# Patient Record
Sex: MALE | Race: BLACK OR AFRICAN AMERICAN | Employment: OTHER | URBAN - METROPOLITAN AREA
[De-identification: names, ages, dates, MRNs, and addresses within clinical notes are randomized per-mention and may not be internally consistent; named-entity substitution may affect disease eponyms.]

---
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
## 2022-02-25 ENCOUNTER — OFFICE VISIT (OUTPATIENT)
Dept: NEUROLOGY | Age: 22
End: 2022-02-25
Payer: MEDICAID

## 2022-02-25 VITALS
HEART RATE: 80 BPM | SYSTOLIC BLOOD PRESSURE: 124 MMHG | DIASTOLIC BLOOD PRESSURE: 88 MMHG | OXYGEN SATURATION: 97 % | BODY MASS INDEX: 27.16 KG/M2 | TEMPERATURE: 97.4 F | HEIGHT: 71 IN | WEIGHT: 194 LBS

## 2022-02-25 DIAGNOSIS — G40.919 BREAKTHROUGH SEIZURE (HCC): ICD-10-CM

## 2022-02-25 DIAGNOSIS — G40.909 SEIZURE DISORDER (HCC): Primary | ICD-10-CM

## 2022-02-25 PROCEDURE — 99205 OFFICE O/P NEW HI 60 MIN: CPT | Performed by: NURSE PRACTITIONER

## 2022-02-25 RX ORDER — LEVETIRACETAM 1000 MG/1
1000 TABLET ORAL 2 TIMES DAILY
Qty: 60 TABLET | Refills: 5 | Status: SHIPPED | OUTPATIENT
Start: 2022-02-25 | End: 2022-09-27 | Stop reason: ALTCHOICE

## 2022-02-25 RX ORDER — LEVETIRACETAM 1000 MG/1
1000 TABLET ORAL 2 TIMES DAILY
COMMUNITY
End: 2022-02-25 | Stop reason: ALTCHOICE

## 2022-02-25 NOTE — PROGRESS NOTES
Sara Christiansen is a [address] y.o. male who presents with the following  Chief Complaint   Patient presents with    Epilepsy       HPI    New patient with aunt for seizures. 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 Keppra level was 0   He had been out for a few days per report  1000 mg BID works well for him and he does well when he takes it appropriately. He has not had any since then   He does not get headaches  He has some cognitive deficits post accident   He has noticed he can not remember much  Mentally has trouble expressing, comprehending. Aunt agrees. He does feel like overall he is doing well today   Likes to watch TV   Play computer games. Sleeping well   Eating well. Some dizziness when getting up from bed in the morning. Allergies   Allergen Reactions    Bactrim [Sulfamethoprim Ds] Itching    Zithromax [Azithromycin] Hives       Current Outpatient Medications   Medication Sig    levETIRAcetam (Keppra) 1,000 mg tablet Take 1 Tablet by mouth two (2) times a day. No current facility-administered medications for this visit. Social History     Tobacco Use   Smoking Status Passive Smoke Exposure - Never Smoker   Smokeless Tobacco Never Used       Past Medical History:   Diagnosis Date    Other ill-defined conditions(611.89)     ADHD       No past surgical history on file. No family history on file.     Social History     Socioeconomic History    Marital status: SINGLE   Tobacco Use    Smoking status: Passive Smoke Exposure - Never Smoker    Smokeless tobacco: Never Used   Substance and Sexual Activity  Alcohol use: No    Drug use: No       Review of Systems   Eyes: Negative for blurred vision, double vision and photophobia. Gastrointestinal: Negative for nausea and vomiting. Neurological: Positive for dizziness and seizures. Negative for loss of consciousness, weakness and headaches. Remainder of comprehensive review is negative. Physical Exam :    Visit Vitals  /88   Pulse 80   Temp 97.4 °F (36.3 °C)   Ht 5' 11\" (1.803 m)   Wt 88 kg (194 lb)   SpO2 97%   BMI 27.06 kg/m²       General: Well defined, nourished, and groomed individual in no acute distress.    Neck: Supple, nontender, no bruits, no pain with resistance to active range of motion.    Musculoskeletal: Extremities revealed no edema and had full range of motion of joints.    Psych: Good mood and bright affect    NEUROLOGICAL EXAMINATION:    Mental Status: Alert and oriented to person, place, and time    Cranial Nerves:    II, III, IV, VI: Visual acuity grossly intact. Visual fields are normal.    Pupils are equal, round, and reactive to light and accommodation.    Extra-ocular movements are full and fluid. Fundoscopic exam was benign, no ptosis or nystagmus.    V-XII: Hearing is grossly intact. Facial features are symmetric, with normal sensation and strength. The palate rises symmetrically and the tongue protrudes midline. Sternocleidomastoids 5/5. Motor Examination: Normal tone, bulk, and strength, 5/5 muscle strength throughout. Coordination: Finger to nose was normal. No resting or intention tremor    Gait and Station: Steady while walking. Normal arm swing. No pronator drift. No muscle wasting or fasiculations noted. Reflexes: DTRs 2+ throughout.       Results for orders placed or performed during the hospital encounter of 03/18/14   URINALYSIS W/ REFLEX CULTURE    Specimen: Urine   Result Value Ref Range    Color YELLOW/STRAW     Appearance CLEAR CLEAR    Specific gravity 1.030 1.003 - 1.030      pH (UA) 6.0 5.0 - 8.0      Protein NEGATIVE  NEGATIVE mg/dL    Glucose NEGATIVE  NEGATIVE mg/dL    Ketone NEGATIVE  NEGATIVE mg/dL    Bilirubin NEGATIVE  NEGATIVE    Blood NEGATIVE  NEGATIVE    Urobilinogen 0.2 0.2 - 1.0 EU/dL    Nitrites NEGATIVE  NEGATIVE    Leukocyte Esterase NEGATIVE  NEGATIVE    UA:UC IF INDICATED CULTURE NOT INDICATED BY UA RESULT CULTURE NOT INDICATE    WBC 0-4 0 - 4 /hpf    RBC 0-5 0 - 5 /hpf    Epithelial cells FEW FEW /lpf    Bacteria NEGATIVE  NEGATIVE /hpf    Hyaline cast 0-2 0 - 2   CBC WITH AUTOMATED DIFF   Result Value Ref Range    WBC 17.4 (H) 3.8 - 9.8 K/uL    RBC 4.84 4.03 - 5.29 M/uL    HGB 13.8 11.0 - 14.5 g/dL    HCT 38.7 33.9 - 43.5 %    MCV 80.0 76.7 - 89.2 FL    MCH 28.5 25.2 - 30.2 PG    MCHC 35.7 (H) 31.8 - 34.8 g/dL    RDW 13.1 12.4 - 14.5 %    PLATELET 275 305 - 725 K/uL    NEUTROPHILS 83 (H) 33 - 75 %    LYMPHOCYTES 11 (L) 16 - 53 %    MONOCYTES 6 4 - 12 %    EOSINOPHILS 0 0 - 4 %    BASOPHILS 0 0 - 1 %    ABS. NEUTROPHILS 14.4 (H) 1.5 - 7.0 K/UL    ABS. LYMPHOCYTES 1.9 1.0 - 3.3 K/UL    ABS. MONOCYTES 1.1 (H) 0.2 - 0.8 K/UL    ABS. EOSINOPHILS 0.0 0.0 - 0.4 K/UL    ABS. BASOPHILS 0.0 0.0 - 0.1 K/UL       Orders Placed This Encounter    MRI BRAIN W WO CONT     Standing Status:   Future     Standing Expiration Date:   3/25/2023     Order Specific Question:   STAT Creatinine as indicated     Answer: Yes    EEG AMB NEURO     Order Specific Question:   Reason for Exam:     Answer:   seizure    DISCONTD: levETIRAcetam 1,000 mg tablet     Sig: Take 1,000 mg by mouth two (2) times a day.  levETIRAcetam (Keppra) 1,000 mg tablet     Sig: Take 1 Tablet by mouth two (2) times a day. Dispense:  60 Tablet     Refill:  5       1. Seizure disorder ([address]    2. Breakthrough seizure ([facility]      MRI brain to rule out stroke, lesion, mass. EEG for evaluation of epilepsy  Will get records from [facility] and [facility] brain surgery and can evaluate for better treatment.    Keep Keppra at 1000 mg BID   Will send plenty of refills. Keep track of symptoms. Understands what drops threshold.                This note will not be viewable in [employer]

## 2022-03-04 DIAGNOSIS — G40.919 BREAKTHROUGH SEIZURE (HCC): Primary | ICD-10-CM

## 2022-03-04 DIAGNOSIS — G40.909 SEIZURE DISORDER (HCC): ICD-10-CM

## 2022-04-06 ENCOUNTER — HOSPITAL ENCOUNTER (OUTPATIENT)
Dept: MRI IMAGING | Age: 22
Discharge: HOME | End: 2022-04-06
Attending: NURSE PRACTITIONER

## 2022-04-06 VITALS — WEIGHT: 194 LBS | BODY MASS INDEX: 27.06 KG/M2

## 2022-04-06 DIAGNOSIS — G40.909 SEIZURE DISORDER (HCC): ICD-10-CM

## 2022-04-06 DIAGNOSIS — G40.919 BREAKTHROUGH SEIZURE (HCC): ICD-10-CM

## 2022-04-06 RX ORDER — MIDAZOLAM HYDROCHLORIDE 1 MG/ML
5 INJECTION, SOLUTION INTRAMUSCULAR; INTRAVENOUS
Status: DISCONTINUED | OUTPATIENT
Start: 2022-04-06 | End: 2022-04-10 | Stop reason: HOSPADM

## 2022-04-06 RX ORDER — FENTANYL CITRATE 50 UG/ML
100 INJECTION, SOLUTION INTRAMUSCULAR; INTRAVENOUS
Status: DISCONTINUED | OUTPATIENT
Start: 2022-04-06 | End: 2022-04-10 | Stop reason: HOSPADM

## 2022-04-06 RX ORDER — GADOTERATE MEGLUMINE 376.9 MG/ML
17 INJECTION INTRAVENOUS
Status: ACTIVE | OUTPATIENT
Start: 2022-04-06 | End: 2022-04-06

## 2022-04-06 NOTE — PROGRESS NOTES
[id number] Patient brought ambulatory back to [facility] for scheduled MRI of brain with conscious sedation. Placed in a gown NPO and  confirmed. MRI screening form reviewed and patient with previous brain surgery with hardware in place. Patient or patient's mother do not have medical device card for hardware so MRI to be rescheduled after mother obtains from doctor. Patient dressed and escorted back to waiting area. Mother with number for scheduling.

## 2022-05-12 ENCOUNTER — TELEPHONE (OUTPATIENT)
Dept: NEUROLOGY | Age: 22
End: 2022-05-12

## 2022-05-12 ENCOUNTER — PATIENT MESSAGE (OUTPATIENT)
Dept: NEUROLOGY | Age: 22
End: 2022-05-12

## 2022-05-12 NOTE — TELEPHONE ENCOUNTER
Yes please  And can you get a record release  And see if they changed his medications?    Likely will want to increase if no trigger

## 2022-05-12 NOTE — TELEPHONE ENCOUNTER
Per [facility] My Chart message sent 5/11/22 @ 5:49 PM,  Patient had a seizure 5/10/22 around 10 am. He was taken to [facility] ER. CT did not show anything. MRI is not done yet due to bolt in head. Ms Shilpa Charles wants to know if your want to see him now or after the MRI, which should be soon. Patient has no future appointments.

## 2022-06-06 ENCOUNTER — OFFICE VISIT (OUTPATIENT)
Dept: NEUROLOGY | Age: 22
End: 2022-06-06
Payer: MEDICAID

## 2022-06-06 VITALS
TEMPERATURE: 97.9 F | OXYGEN SATURATION: 97 % | HEART RATE: 67 BPM | SYSTOLIC BLOOD PRESSURE: 102 MMHG | DIASTOLIC BLOOD PRESSURE: 66 MMHG

## 2022-06-06 DIAGNOSIS — G40.909 SEIZURE DISORDER (HCC): Primary | ICD-10-CM

## 2022-06-06 DIAGNOSIS — G40.919 BREAKTHROUGH SEIZURE (HCC): ICD-10-CM

## 2022-06-06 PROCEDURE — 99215 OFFICE O/P EST HI 40 MIN: CPT | Performed by: NURSE PRACTITIONER

## 2022-06-06 RX ORDER — MIDAZOLAM 5 MG/.1ML
1 SPRAY NASAL
Qty: 2 EACH | Refills: 1 | Status: SHIPPED | OUTPATIENT
Start: 2022-06-06

## 2022-06-06 RX ORDER — LEVETIRACETAM 1000 MG/1
TABLET ORAL
Qty: 90 TABLET | Refills: 5 | Status: SHIPPED | OUTPATIENT
Start: 2022-06-06

## 2022-06-07 NOTE — PROGRESS NOTES
Lamar Head is a 22 y.o. male who presents with the following  Chief Complaint   Patient presents with    Epilepsy       HPI    Fu with family for epilepsy. Did have a few breakthrough seizures since last visit  Has been on Keppra 1000 mg BID   Taking as he should   Went to [facility] ER earlier this month for a breakthrough   He felt it coming on   He felt off, dizzy. Had a seizure lasting about 2 minutes  Called EMS and was finished by the time they got there  He did bite his tongue. He has not used Nayzilam   He is not having any headaches, dizziness. No weakness, paresthesia  Playing games during the day   He is sleeping well per report  No anxiety or depression   He feels good today   Did not get testing due to possibly clip in head  Will re-order and needs sedation it will be fine   Will re-order EEG   May need a partial type drug. Allergies   Allergen Reactions    Bactrim [Sulfamethoprim Ds] Itching    Zithromax [Azithromycin] Hives       Current Outpatient Medications   Medication Sig    midazolam (Nayzilam) 5 mg/spray (0.1 mL) spry [place] by Both Nostrils route daily as needed (SEIZURE).  levETIRAcetam (Keppra) 1,000 mg tablet Take 1.5 tablets by mouth BID    levETIRAcetam (Keppra) 1,000 mg tablet Take 1 Tablet by mouth two (2) times a day. No current facility-administered medications for this visit. Social History     Tobacco Use   Smoking Status Passive Smoke Exposure - Never Smoker   Smokeless Tobacco Never Used       Past Medical History:   Diagnosis Date    Other ill-defined conditions(240.74)     ADHD       No past surgical history on file. No family history on file.     Social History     Socioeconomic History    Marital status: SINGLE   Tobacco Use    Smoking status: Passive Smoke Exposure - Never Smoker    Smokeless tobacco: Never Used   Substance and Sexual Activity    Alcohol use: No    Drug use: No       Review of Systems   Eyes: Negative for blurred vision, double vision and photophobia. Respiratory: Negative for shortness of breath and wheezing. Cardiovascular: Negative for chest pain and palpitations. Neurological: Positive for seizures and loss of consciousness. Negative for dizziness, tingling and headaches. Remainder of comprehensive review is negative. Physical Exam :    Visit Vitals  /66   Pulse 67   Temp 97.9 °F (36.6 °C)   SpO2 97%       General: Well defined, nourished, and groomed individual in no acute distress.    Neck: Supple, nontender, no bruits, no pain with resistance to active range of motion.    Musculoskeletal: Extremities revealed no edema and had full range of motion of joints.    Psych: Good mood and bright affect    NEUROLOGICAL EXAMINATION:    Mental Status: Alert and oriented to person, place, and time    Cranial Nerves:    II, III, IV, VI: Visual acuity grossly intact. Visual fields are normal.    Pupils are equal, round, and reactive to light and accommodation.    Extra-ocular movements are full and fluid. Fundoscopic exam was benign, no ptosis or nystagmus.    V-XII: Hearing is grossly intact. Facial features are symmetric, with normal sensation and strength. The palate rises symmetrically and the tongue protrudes midline. Sternocleidomastoids 5/5. Motor Examination: Normal tone, bulk, and strength, 5/5 muscle strength throughout. Coordination: Finger to nose was normal. No resting or intention tremor    Gait and Station: Steady while walking. Normal arm swing. No pronator drift. No muscle wasting or fasiculations noted. Reflexes: DTRs 2+ throughout.             Results for orders placed or performed during the hospital encounter of 03/18/14   URINALYSIS W/ REFLEX CULTURE    Specimen: Urine   Result Value Ref Range    Color YELLOW/STRAW     Appearance CLEAR CLEAR    Specific gravity 1.030 1.003 - 1.030      pH (UA) 6.0 5.0 - 8.0      Protein NEGATIVE  NEGATIVE mg/dL    Glucose NEGATIVE  NEGATIVE mg/dL    Ketone NEGATIVE  NEGATIVE mg/dL    Bilirubin NEGATIVE  NEGATIVE    Blood NEGATIVE  NEGATIVE    Urobilinogen 0.2 0.2 - 1.0 EU/dL    Nitrites NEGATIVE  NEGATIVE    Leukocyte Esterase NEGATIVE  NEGATIVE    UA:UC IF INDICATED CULTURE NOT INDICATED BY UA RESULT CULTURE NOT INDICATE    WBC 0-4 0 - 4 /hpf    RBC 0-5 0 - 5 /hpf    Epithelial cells FEW FEW /lpf    Bacteria NEGATIVE  NEGATIVE /hpf    Hyaline cast 0-2 0 - 2   CBC WITH AUTOMATED DIFF   Result Value Ref Range    WBC 17.4 (H) 3.8 - 9.8 K/uL    RBC 4.84 4.03 - 5.29 M/uL    HGB 13.8 11.0 - 14.5 g/dL    HCT 38.7 33.9 - 43.5 %    MCV 80.0 76.7 - 89.2 FL    MCH 28.5 25.2 - 30.2 PG    MCHC 35.7 (H) 31.8 - 34.8 g/dL    RDW 13.1 12.4 - 14.5 %    PLATELET 837 085 - 210 K/uL    NEUTROPHILS 83 (H) 33 - 75 %    LYMPHOCYTES 11 (L) 16 - 53 %    MONOCYTES 6 4 - 12 %    EOSINOPHILS 0 0 - 4 %    BASOPHILS 0 0 - 1 %    ABS. NEUTROPHILS 14.4 (H) 1.5 - 7.0 K/UL    ABS. LYMPHOCYTES 1.9 1.0 - 3.3 K/UL    ABS. MONOCYTES 1.1 (H) 0.2 - 0.8 K/UL    ABS. EOSINOPHILS 0.0 0.0 - 0.4 K/UL    ABS. BASOPHILS 0.0 0.0 - 0.1 K/UL       Orders Placed This Encounter    MRI BRAIN W WO CONT     WILL NEED CONSCIOUS SEDATION     Standing Status:   Future     Standing Expiration Date:   2023     Scheduling Instructions:      WILL NEED IV SEDATION     Order Specific Question:   STAT Creatinine as indicated     Answer: Yes    EEG AMB NEURO     Order Specific Question:   Reason for Exam:     Answer:   seizure    midazolam (Nayzilam) 5 mg/spray (0.1 mL) spry     [place] by Both Nostrils route daily as needed (SEIZURE). Dispense:  2 Each     Refill:  1    levETIRAcetam (Keppra) 1,000 mg tablet     Sig: Take 1.5 tablets by mouth BID     Dispense:  90 Tablet     Refill:  5       1. Seizure disorder ([address]    2.  Breakthrough seizure ([address]      Increase Keppra to 1500 mg BID for AED   MRI brain   EEG    Nayzilam for breakthrough as he can feel them coming on usually   Use this if aura  FU after.                This note will not be viewable in [employer]

## 2022-08-08 ENCOUNTER — HOSPITAL ENCOUNTER (OUTPATIENT)
Dept: MRI IMAGING | Age: 22
Discharge: HOME | End: 2022-08-08
Attending: NURSE PRACTITIONER
Payer: MEDICAID

## 2022-08-08 VITALS
SYSTOLIC BLOOD PRESSURE: 113 MMHG | OXYGEN SATURATION: 94 % | DIASTOLIC BLOOD PRESSURE: 69 MMHG | HEART RATE: 54 BPM | WEIGHT: 194 LBS | BODY MASS INDEX: 27.06 KG/M2 | RESPIRATION RATE: 14 BRPM

## 2022-08-08 DIAGNOSIS — G40.919 BREAKTHROUGH SEIZURE (HCC): ICD-10-CM

## 2022-08-08 DIAGNOSIS — G40.909 SEIZURE DISORDER (HCC): ICD-10-CM

## 2022-08-08 PROCEDURE — 74011250636 HC RX REV CODE- 250/636: Performed by: RADIOLOGY

## 2022-08-08 PROCEDURE — 74011250636 HC RX REV CODE- 250/636: Performed by: STUDENT IN AN ORGANIZED HEALTH CARE EDUCATION/TRAINING PROGRAM

## 2022-08-08 PROCEDURE — 70553 MRI BRAIN STEM W/O & W/DYE: CPT

## 2022-08-08 PROCEDURE — A9576 INJ PROHANCE MULTIPACK: HCPCS | Performed by: RADIOLOGY

## 2022-08-08 PROCEDURE — 99152 MOD SED SAME PHYS/QHP 5/>YRS: CPT

## 2022-08-08 PROCEDURE — 99153 MOD SED SAME PHYS/QHP EA: CPT

## 2022-08-08 RX ORDER — FENTANYL CITRATE 50 UG/ML
100 INJECTION, SOLUTION INTRAMUSCULAR; INTRAVENOUS
Status: DISCONTINUED | OUTPATIENT
Start: 2022-08-08 | End: 2022-08-08

## 2022-08-08 RX ORDER — MIDAZOLAM HYDROCHLORIDE 1 MG/ML
5 INJECTION, SOLUTION INTRAMUSCULAR; INTRAVENOUS
Status: DISCONTINUED | OUTPATIENT
Start: 2022-08-08 | End: 2022-08-08

## 2022-08-08 RX ADMIN — MIDAZOLAM HYDROCHLORIDE 1 MG: 1 INJECTION, SOLUTION INTRAMUSCULAR; INTRAVENOUS at 07:54

## 2022-08-08 RX ADMIN — GADOTERIDOL 17 ML: 279.3 INJECTION, SOLUTION INTRAVENOUS at 08:34

## 2022-08-08 RX ADMIN — FENTANYL CITRATE 25 MCG: 50 INJECTION, SOLUTION INTRAMUSCULAR; INTRAVENOUS at 08:00

## 2022-08-08 RX ADMIN — FENTANYL CITRATE 25 MCG: 50 INJECTION, SOLUTION INTRAMUSCULAR; INTRAVENOUS at 07:54

## 2022-08-08 RX ADMIN — FENTANYL CITRATE 25 MCG: 50 INJECTION, SOLUTION INTRAMUSCULAR; INTRAVENOUS at 07:57

## 2022-08-08 RX ADMIN — MIDAZOLAM HYDROCHLORIDE 1 MG: 1 INJECTION, SOLUTION INTRAMUSCULAR; INTRAVENOUS at 07:57

## 2022-08-08 RX ADMIN — FENTANYL CITRATE 25 MCG: 50 INJECTION, SOLUTION INTRAMUSCULAR; INTRAVENOUS at 07:51

## 2022-08-08 RX ADMIN — MIDAZOLAM HYDROCHLORIDE 2 MG: 1 INJECTION, SOLUTION INTRAMUSCULAR; INTRAVENOUS at 08:00

## 2022-08-08 RX ADMIN — MIDAZOLAM HYDROCHLORIDE 1 MG: 1 INJECTION, SOLUTION INTRAMUSCULAR; INTRAVENOUS at 07:51

## 2022-08-08 NOTE — DISCHARGE INSTRUCTIONS
MRI of the Head: About This Test  What is it? MRI (magnetic resonance imaging) is a test that uses a magnetic field and pulses of radio wave energy to make pictures of the organs and structures inside the body. An MRI of the head can give your doctor information about your brain, eyes, ears, and nerves. When you have an MRI, you lie on a table and the table moves into the MRI machine. Why is this test done? An MRI of the head can help find problems such as tumors and infection. It also can check symptoms of a head injury or of diseases such as multiple sclerosis (MS) and stroke. How do you prepare for the test?  In general, there's nothing you have to do before this test, unless your doctor tells you to. Tell your doctor if you get nervous in tight spaces. You may get a medicine to help you relax. If you think you'll get this medicine, be sure you have someone to take you home. What happens during the test?  You may have contrast material (dye) put into your arm through a tube called an IV. You will lie on a table that's part of the MRI scanner. The table will slide into the space that contains the magnet. Inside the scanner, you will hear a fan and feel air moving. You may hear tapping, thumping, or snapping noises. You may be given earplugs or headphones to reduce the noise. You will be asked to hold still during the scan. You may be asked to hold your breath for short periods. You may be alone in the scanning room. But a technologist will watch through a window and talk with you during the test.  How long does the test take? The test usually takes 30 to 60 minutes but can take as long as 2 hours. How does having an MRI of the head feel? You won't have pain from the magnetic field or radio waves used for the MRI test. But you may be tired or sore from lying in one position for a long time. If a contrast material is used, you may feel some coolness when it is put into your IV.   In rare cases, you may feel:  Tingling in the mouth if you have metal dental fillings. Warmth in the area being checked. This is normal. Tell the technologist if you have nausea, vomiting, a headache, dizziness, pain, burning, or breathing problems. What are the risks of an MRI of the head? There are no known harmful effects from the strong magnetic field used for an MRI. But the magnet is very powerful. It may affect any metal implants or other medical devices you have. Risks from contrast material  Contrast material that contains gadolinium may be used in this test. But for most people, the benefit of its use in this test outweighs the risk. Be sure to tell your doctor if you have kidney problems or are pregnant. There is a slight chance of an allergic reaction if contrast material is used during the test. But most reactions are mild and can be treated using medicine. If you breastfeed and are concerned about whether the contrast material used in this test is safe, talk to your doctor. Most experts believe that very little dye passes into breast milk and even less is passed on to the baby. But if you are concerned, you can stop breastfeeding for up to 24 hours after the test. During this time, you can give your baby breast milk that you stored before the test. Don't use the breast milk you pump in the 24 hours after the test. Throw it out. What happens after the test?  You will probably be able to go home right away. It depends on the reason for the test.  You can go back to your usual activities right away. Follow-up care is a key part of your treatment and safety. Be sure to make and go to all appointments, and call your doctor if you are having problems. It's also a good idea to keep a list of the medicines you take. Ask your doctor when you can expect to have your test results. Where can you learn more?   Go to http://www.gray.com/  Enter S997 in the search box to learn more about \"MRI of the Head: About This Test.\"  Current as of: June 17, 2021               Content Version: 13.2  © 2006-2022 [employer]. Care instructions adapted under license by [employer] (which disclaims liability or warranty for this information). If you have questions about a medical condition or this instruction, always ask your healthcare professional. [address] any warranty or liability for your use of this information. Learning About Sedation  What is sedation? Sedation is medicine that helps you feel relaxed and comfortable. It may be used with an injection to numb the area or other medicine to reduce pain. It is often used in procedures like a colonoscopy or a biopsy. It is also used in many minor surgeries. You may be awake and able to talk with your care team. Or you may fall asleep. You might remember little, if anything, of the procedure. What are the levels of sedation? There are three levels of sedation. You may start at one level and go to a deeper level during your procedure. Your doctor may give you oxygen to make sure your blood has plenty of oxygen while you're sleepy. Minimal.  In the lowest level of sedation, you are awake and relaxed and can do what the doctor asks you to do. You can understand questions and answer them. It can help you feel calm during your procedure. And it can be used if deeper levels of sedation are less safe for you. Minimal sedation is used for very minor procedures such as the removal of a small skin lesion or a vasectomy. Moderate. You are relaxed and feel drowsy. You may fall asleep, but someone can wake you easily. Afterward, you may not remember anything about your procedure. Moderate sedation is used for more uncomfortable procedures like small hernia repairs, some eye surgeries, or colonoscopies. Deep. You are asleep (unconscious) during your procedure.  You will get oxygen and may need help with breathing. You probably won't remember anything. Deep sedation is used during procedures like hand or foot surgeries or tests that can make you very uncomfortable. How do you prepare? Your doctor will tell you what to expect when you have sedation. You will be asked to sign a consent form that says you understand the risks. You will get instructions to help you prepare for your procedure. You'll be told when to stop eating or drinking. If you take medicine, you will be told what you can and can't take beforehand. Do not smoke for as long as possible, but at least 1 month, before your procedure. Smoking can increase your risk of problems under sedation and can delay your recovery. If you need help quitting, talk to your doctor about stop-smoking programs and medicines. These can increase your chances of quitting for good. Arrange for a friend or a family member to drive you home afterward. Don't plan to drive yourself. How is it done? Sedation is usually given in a vein in the arm (intravenously, or IV). It is often used with local or regional anesthesia. The local type numbs a small part of the body. The regional type blocks pain to a larger area of the body. With lighter levels of sedation, a doctor or nurse will watch your vital signs. This includes checking your breathing, blood pressure, and heart rate. With deeper levels, an anesthesia professional may be there during the procedure to help keep you safe. This is often called monitored anesthesia care (MAC). What are the risks? Serious problems are rare. They include breathing that slows or stops and an allergic reaction to the medicine. Some health issues may increase the risk of problems. These include:  Smoking. Sleep apnea. This happens during sleep when a blocked airway causes breathing problems. Being overweight. What can you expect after sedation? After your procedure, you will have time to let the sedation wear off.  You may feel some pain or discomfort from your procedure. If you have pain, don't be afraid to say so. Pain medicine works better if you take it before the pain gets bad. You may feel some of the side effects of sedation for a while. They include:  Feeling tired or sleepy. Nausea and vomiting. This is rare and does not last long. You may be given medicine to help you feel better. A headache. If you've had sedation, wait 24 hours before you drive or operate heavy machinery, make important decisions, go to work or school, or sign legal documents. It takes time for the medicine's effects to completely wear off. Current as of: October 6, 2021               Content Version: 13.2  © 2006-2022 [employer]. Care instructions adapted under license by [employer] (which disclaims liability or warranty for this information). If you have questions about a medical condition or this instruction, always ask your healthcare professional. [address] any warranty or liability for your use of this information.

## 2022-08-08 NOTE — PROGRESS NOTES
Patient brought to radiology holding from waiting room today for MRI with conscious sedation    Waiting for patient is Sneha who can be reached at [phone]    Patient is alert and oriented x 4, sinus bradycardia on the cardiac monitor    IV initiated in the right AC, 22 gauge gauge, positive blood return    Dr Jeannette Stuart at bedside at 7740 for consent.   All questions answered    To MRI at 0750    Start time 0751    End time 0828    Patient received a total of 100 mcg fentanyl and 5 mg versed during MRI    Returned to radiology holding at [facility]    Discharged at [facility], discharge paperwork and teaching provided    Bhupinder Cifuentes RN

## 2022-09-08 ENCOUNTER — OFFICE VISIT (OUTPATIENT)
Dept: NEUROLOGY | Age: 22
End: 2022-09-08

## 2022-09-08 DIAGNOSIS — G40.919 BREAKTHROUGH SEIZURE (HCC): Primary | ICD-10-CM

## 2022-09-18 NOTE — PROCEDURES
[facility]   EEG Report    Patient: [place]  2000  Date of Service: 9/8/2022  Referring:  Renea Jolley    An EEG is requested in this 52-year-old man with epilepsy and breakthrough seizure to evaluate for epileptiform abnormality. Medications listed as Versed and Keppra. During wakefulness the background consists of diffuse low voltage fast frequency beta wave activity    Hyperventilation is not performed    Intermittent photic stimulation little alters the tracing. Sleep is not attained. Interpretation  This EEG recorded during the awake state is normal.  No epileptiform abnormalities are seen.     Adelina Cintron MD

## 2022-09-27 ENCOUNTER — TELEPHONE (OUTPATIENT)
Dept: NEUROLOGY | Age: 22
End: 2022-09-27

## 2022-09-27 ENCOUNTER — OFFICE VISIT (OUTPATIENT)
Dept: NEUROLOGY | Age: 22
End: 2022-09-27
Payer: MEDICAID

## 2022-09-27 VITALS
HEART RATE: 88 BPM | BODY MASS INDEX: 26.6 KG/M2 | HEIGHT: 71 IN | DIASTOLIC BLOOD PRESSURE: 60 MMHG | WEIGHT: 190 LBS | SYSTOLIC BLOOD PRESSURE: 110 MMHG | OXYGEN SATURATION: 97 %

## 2022-09-27 DIAGNOSIS — G40.909 SEIZURE DISORDER (HCC): Primary | ICD-10-CM

## 2022-09-27 PROCEDURE — 99214 OFFICE O/P EST MOD 30 MIN: CPT | Performed by: NURSE PRACTITIONER

## 2022-09-28 NOTE — PROGRESS NOTES
Mylene Vazquez is a 22 y.o. male who presents with the following  Chief Complaint   Patient presents with    Seizure       HPI    Follow-up for seizure disorder with on  No seizures since before last visit  He is currently on Keppra 1000 mg twice a day  He is here to also go over test results  He has not had any seizures, jerking, twitching or convulsions  No loss of consciousness  He does play video games during the day and will take breaks when the lights trigger headaches  He sleeps okay he was woken up a couple times where he has been covered in sweat but not any kind of seizure activity  He does eat okay  He has no anxiety or depression  No other concerns from family    Allergies   Allergen Reactions    Bactrim [Sulfamethoprim Ds] Itching    Zithromax [Azithromycin] Hives       Current Outpatient Medications   Medication Sig    midazolam (Nayzilam) 5 mg/spray (0.1 mL) spry 1 [place] by Both Nostrils route daily as needed (SEIZURE). levETIRAcetam (Keppra) 1,000 mg tablet Take 1.5 tablets by mouth BID     No current facility-administered medications for this visit. Social History     Tobacco Use   Smoking Status Passive Smoke Exposure - Never Smoker   Smokeless Tobacco Never       Past Medical History:   Diagnosis Date    Other ill-defined conditions(515.27)     ADHD       No past surgical history on file. No family history on file. Social History     Socioeconomic History    Marital status: SINGLE   Tobacco Use    Smoking status: Passive Smoke Exposure - Never Smoker    Smokeless tobacco: Never   Substance and Sexual Activity    Alcohol use: No    Drug use: No       Review of Systems   Eyes:  Negative for blurred vision, double vision and photophobia. Respiratory:  Negative for cough and hemoptysis. Gastrointestinal:  Negative for nausea and vomiting. Neurological:  Negative for seizures, loss of consciousness and weakness. Remainder of comprehensive review is negative.      Physical Exam :    Visit Vitals  /60   Pulse 88   Ht 5' 11\" (1.803 m)   Wt 86.2 kg (190 lb)   SpO2 97%   BMI 26.50 kg/m²       General: Well defined, nourished, and groomed individual in no acute distress. Neck: Supple, nontender, no bruits, no pain with resistance to active range of motion. Musculoskeletal: Extremities revealed no edema and had full range of motion of joints. Psych: Good mood and bright affect    NEUROLOGICAL EXAMINATION:    Mental Status: Alert and oriented to person, place, and time    Cranial Nerves:    II, III, IV, VI: Visual acuity grossly intact. Visual fields are normal.    Pupils are equal, round, and reactive to light and accommodation. Extra-ocular movements are full and fluid. Fundoscopic exam was benign, no ptosis or nystagmus. V-XII: Hearing is grossly intact. Facial features are symmetric, with normal sensation and strength. The palate rises symmetrically and the tongue protrudes midline. Sternocleidomastoids 5/5. Motor Examination: Normal tone, bulk, and strength, 5/5 muscle strength throughout. Coordination: Finger to nose was normal. No resting or intention tremor    Gait and Station: Steady while walking. Normal arm swing. No pronator drift. No muscle wasting or fasiculations noted. Reflexes: DTRs 2+ throughout.             Results for orders placed or performed during the hospital encounter of 03/18/14   URINALYSIS W/ REFLEX CULTURE    Specimen: Urine   Result Value Ref Range    Color YELLOW/STRAW     Appearance CLEAR CLEAR    Specific gravity 1.030 1.003 - 1.030      pH (UA) 6.0 5.0 - 8.0      Protein NEGATIVE NEGATIVE mg/dL    Glucose NEGATIVE NEGATIVE mg/dL    Ketone NEGATIVE NEGATIVE mg/dL    Bilirubin NEGATIVE NEGATIVE    Blood NEGATIVE NEGATIVE    Urobilinogen 0.2 0.2 - 1.0 EU/dL    Nitrites NEGATIVE NEGATIVE    Leukocyte Esterase NEGATIVE NEGATIVE    UA:UC IF INDICATED CULTURE NOT INDICATED BY UA RESULT CULTURE NOT INDICATE    WBC 0-4 0 - 4 /hpf    RBC 0-5 0 - 5 /hpf    Epithelial cells FEW FEW /lpf    Bacteria NEGATIVE NEGATIVE /hpf    Hyaline cast 0-2 0 - 2   CBC WITH AUTOMATED DIFF   Result Value Ref Range    WBC 17.4 (H) 3.8 - 9.8 K/uL    RBC 4.84 4.03 - 5.29 M/uL    HGB 13.8 11.0 - 14.5 g/dL    HCT 38.7 33.9 - 43.5 %    MCV 80.0 76.7 - 89.2 FL    MCH 28.5 25.2 - 30.2 PG    MCHC 35.7 (H) 31.8 - 34.8 g/dL    RDW 13.1 12.4 - 14.5 %    PLATELET 227 787 - 896 K/uL    NEUTROPHILS 83 (H) 33 - 75 %    LYMPHOCYTES 11 (L) 16 - 53 %    MONOCYTES 6 4 - 12 %    EOSINOPHILS 0 0 - 4 %    BASOPHILS 0 0 - 1 %    ABS. NEUTROPHILS 14.4 (H) 1.5 - 7.0 K/UL    ABS. LYMPHOCYTES 1.9 1.0 - 3.3 K/UL    ABS. MONOCYTES 1.1 (H) 0.2 - 0.8 K/UL    ABS. EOSINOPHILS 0.0 0.0 - 0.4 K/UL    ABS. BASOPHILS 0.0 0.0 - 0.1 K/UL       No orders of the defined types were placed in this encounter.       Seizures       Continue Keppra for seizure disorder  Use Nayzilam for as needed seizure rescue  We discussed testing and fall  Continue to track any symptoms and call with any concerns            This note will not be viewable in [facility]

## 2022-12-03 RX ORDER — LEVETIRACETAM 1000 MG/1
TABLET ORAL
Qty: 90 TABLET | Refills: 0 | Status: SHIPPED | OUTPATIENT
Start: 2022-12-03

## 2023-01-04 RX ORDER — LEVETIRACETAM 1000 MG/1
TABLET ORAL
Qty: 90 TABLET | Refills: 0 | Status: SHIPPED | OUTPATIENT
Start: 2023-01-04

## 2023-01-26 RX ORDER — LEVETIRACETAM 1000 MG/1
TABLET ORAL
Qty: 90 TABLET | Refills: 0 | Status: SHIPPED | OUTPATIENT
Start: 2023-01-26 | End: 2023-01-27 | Stop reason: SDUPTHER

## 2023-01-27 ENCOUNTER — OFFICE VISIT (OUTPATIENT)
Dept: NEUROLOGY | Age: 23
End: 2023-01-27
Payer: MEDICAID

## 2023-01-27 VITALS
OXYGEN SATURATION: 96 % | SYSTOLIC BLOOD PRESSURE: 108 MMHG | HEART RATE: 76 BPM | BODY MASS INDEX: 26.22 KG/M2 | DIASTOLIC BLOOD PRESSURE: 74 MMHG | WEIGHT: 188 LBS

## 2023-01-27 DIAGNOSIS — G40.919 BREAKTHROUGH SEIZURE (HCC): Primary | ICD-10-CM

## 2023-01-27 DIAGNOSIS — G40.909 SEIZURE DISORDER (HCC): ICD-10-CM

## 2023-01-27 PROCEDURE — 99214 OFFICE O/P EST MOD 30 MIN: CPT | Performed by: NURSE PRACTITIONER

## 2023-01-27 RX ORDER — DIAZEPAM 10 MG/100UL
1 SPRAY NASAL
Qty: 2 EACH | Refills: 5 | Status: SHIPPED | OUTPATIENT
Start: 2023-01-27

## 2023-01-27 RX ORDER — LEVETIRACETAM 1000 MG/1
TABLET ORAL
Qty: 270 TABLET | Refills: 1 | Status: SHIPPED | OUTPATIENT
Start: 2023-01-27

## 2023-01-30 NOTE — PROGRESS NOTES
Alesia Mclean is a 25 y.o. male who presents with the following  Chief Complaint   Patient presents with    Follow-up       HPI       Follow-up for seizure disorde  No seizures since before last visit  He is currently on Keppra 1500 mg twice a day    He has not had any seizures, jerking, twitching or convulsions  No loss of consciousness  He does play video games during the day and will take breaks when the lights trigger headaches  He does eat okay  He has no anxiety or depression  No other concerns from family  He did not get the nasal spray but it looks like with insurance they wanted to do the other 1      Allergies   Allergen Reactions    Bactrim [Sulfamethoprim Ds] Itching    Zithromax [Azithromycin] Hives       Current Outpatient Medications   Medication Sig    diazePAM (Valtoco) 20 mg/2 spray (10mg/0.1mL x2) spry 1 Spray by Nasal route daily as needed (intractable seizure). levETIRAcetam 1,000 mg tablet TAKE 1 & 1/2 (ONE & ONE-HALF) TABLETS BY MOUTH TWICE DAILY     No current facility-administered medications for this visit. Social History     Tobacco Use   Smoking Status Passive Smoke Exposure - Never Smoker   Smokeless Tobacco Never       Past Medical History:   Diagnosis Date    Other ill-defined conditions(029.17)     ADHD       No past surgical history on file. No family history on file. Social History     Socioeconomic History    Marital status: SINGLE   Tobacco Use    Smoking status: Passive Smoke Exposure - Never Smoker    Smokeless tobacco: Never   Substance and Sexual Activity    Alcohol use: No    Drug use: No       Review of Systems   Eyes:  Negative for blurred vision, double vision and photophobia. Gastrointestinal:  Negative for nausea and vomiting. Neurological:  Negative for dizziness, tingling, focal weakness, seizures, loss of consciousness, weakness and headaches. Remainder of comprehensive review is negative.      Physical Exam :    Visit Vitals  /74 (BP 1 Location: Left upper arm, BP Patient Position: Sitting, BP Cuff Size: Adult)   Pulse 76   Wt 85.3 kg (188 lb)   SpO2 96%   BMI 26.22 kg/m²       General: Well defined, nourished, and groomed individual in no acute distress. Neck: Supple, nontender, no bruits, no pain with resistance to active range of motion. Heart: Regular rate and rhythm, no murmurs, rub, or gallop. Normal S1S2. Lungs: Clear to auscultation bilaterally with equal chest expansion, no cough, no wheeze  Musculoskeletal: Extremities revealed no edema and had full range of motion of joints. Psych: Good mood and bright affect    NEUROLOGICAL EXAMINATION:    Mental Status: Alert and oriented to person, place, and time    Cranial Nerves:    II, III, IV, VI: Visual acuity grossly intact. Visual fields are normal.    Pupils are equal, round, and reactive to light and accommodation. Extra-ocular movements are full and fluid. Fundoscopic exam was benign, no ptosis or nystagmus. V-XII: Hearing is grossly intact. Facial features are symmetric, with normal sensation and strength. The palate rises symmetrically and the tongue protrudes midline. Sternocleidomastoids 5/5. Motor Examination: Normal tone, bulk, and strength, 5/5 muscle strength throughout. Coordination: Finger to nose was normal. No resting or intention tremor    Gait and Station: Steady while walking. Normal arm swing. No pronator drift. No muscle wasting or fasiculations noted. Reflexes: DTRs 2+ throughout.           Results for orders placed or performed during the hospital encounter of 03/18/14   URINALYSIS W/ REFLEX CULTURE    Specimen: Urine   Result Value Ref Range    Color YELLOW/STRAW     Appearance CLEAR CLEAR    Specific gravity 1.030 1.003 - 1.030      pH (UA) 6.0 5.0 - 8.0      Protein NEGATIVE NEGATIVE mg/dL    Glucose NEGATIVE NEGATIVE mg/dL    Ketone NEGATIVE NEGATIVE mg/dL    Bilirubin NEGATIVE NEGATIVE    Blood NEGATIVE NEGATIVE    Urobilinogen 0.2 0.2 - 1.0 EU/dL    Nitrites NEGATIVE NEGATIVE    Leukocyte Esterase NEGATIVE NEGATIVE    UA:UC IF INDICATED CULTURE NOT INDICATED BY UA RESULT CULTURE NOT INDICATE    WBC 0-4 0 - 4 /hpf    RBC 0-5 0 - 5 /hpf    Epithelial cells FEW FEW /lpf    Bacteria NEGATIVE NEGATIVE /hpf    Hyaline cast 0-2 0 - 2   CBC WITH AUTOMATED DIFF   Result Value Ref Range    WBC 17.4 (H) 3.8 - 9.8 K/uL    RBC 4.84 4.03 - 5.29 M/uL    HGB 13.8 11.0 - 14.5 g/dL    HCT 38.7 33.9 - 43.5 %    MCV 80.0 76.7 - 89.2 FL    MCH 28.5 25.2 - 30.2 PG    MCHC 35.7 (H) 31.8 - 34.8 g/dL    RDW 13.1 12.4 - 14.5 %    PLATELET 984 201 - 980 K/uL    NEUTROPHILS 83 (H) 33 - 75 %    LYMPHOCYTES 11 (L) 16 - 53 %    MONOCYTES 6 4 - 12 %    EOSINOPHILS 0 0 - 4 %    BASOPHILS 0 0 - 1 %    ABS. NEUTROPHILS 14.4 (H) 1.5 - 7.0 K/UL    ABS. LYMPHOCYTES 1.9 1.0 - 3.3 K/UL    ABS. MONOCYTES 1.1 (H) 0.2 - 0.8 K/UL    ABS. EOSINOPHILS 0.0 0.0 - 0.4 K/UL    ABS. BASOPHILS 0.0 0.0 - 0.1 K/UL       Orders Placed This Encounter    diazePAM (Valtoco) 20 mg/2 spray (10mg/0.1mL x2) spry     Si Spray by Nasal route daily as needed (intractable seizure). Dispense:  2 Each     Refill:  5    levETIRAcetam 1,000 mg tablet     Sig: TAKE 1 & 1/2 (ONE & ONE-HALF) TABLETS BY MOUTH TWICE DAILY     Dispense:  270 Tablet     Refill:  1       1.  Breakthrough seizure ([address]    2. Seizure disorder (HCC)          Continue Keppra 1500 mg twice a day  Seizures are stable  Discussed what can cause of breakthrough and he understands  We will get him Valtoco to use as rescue but hopefully he does not need it            This note will not be viewable in MyChart

## 2023-07-23 DIAGNOSIS — G40.919 INTRACTABLE EPILEPSY WITHOUT STATUS EPILEPTICUS, UNSPECIFIED EPILEPSY TYPE (HCC): Primary | ICD-10-CM

## 2023-07-25 RX ORDER — LEVETIRACETAM 1000 MG/1
TABLET ORAL
Qty: 270 TABLET | Refills: 0 | Status: SHIPPED | OUTPATIENT
Start: 2023-07-25 | End: 2023-07-27 | Stop reason: SDUPTHER

## 2023-07-27 ENCOUNTER — OFFICE VISIT (OUTPATIENT)
Age: 23
End: 2023-07-27
Payer: MEDICAID

## 2023-07-27 VITALS
RESPIRATION RATE: 14 BRPM | OXYGEN SATURATION: 99 % | TEMPERATURE: 97.4 F | SYSTOLIC BLOOD PRESSURE: 118 MMHG | HEART RATE: 78 BPM | DIASTOLIC BLOOD PRESSURE: 70 MMHG

## 2023-07-27 DIAGNOSIS — G40.919 INTRACTABLE EPILEPSY WITHOUT STATUS EPILEPTICUS, UNSPECIFIED EPILEPSY TYPE (HCC): ICD-10-CM

## 2023-07-27 PROCEDURE — 99214 OFFICE O/P EST MOD 30 MIN: CPT | Performed by: NURSE PRACTITIONER

## 2023-07-27 RX ORDER — LEVETIRACETAM 1000 MG/1
TABLET ORAL
Qty: 270 TABLET | Refills: 3 | Status: SHIPPED | OUTPATIENT
Start: 2023-07-27

## 2023-07-28 NOTE — PROGRESS NOTES
and had full range of motion of joints. Psych: Good mood and bright affect    NEUROLOGICAL EXAMINATION:    Mental Status: Alert and oriented to person, place, and time    Cranial Nerves:    II, III, IV, VI: Visual acuity grossly intact. Visual fields are normal.    Pupils are equal, round, and reactive to light and accommodation. Extra-ocular movements are full and fluid. Fundoscopic exam was benign, no ptosis or nystagmus. V-XII: Hearing is grossly intact. Facial features are symmetric, with normal sensation and strength. The palate rises symmetrically and the tongue protrudes midline. Sternocleidomastoids 5/5. Motor Examination: Normal tone, bulk, and strength, 5/5 muscle strength throughout. Coordination: Finger to nose was normal. No resting or intention tremor    Gait and Station: Steady while walking. Normal arm swing. No pronator drift. No muscle wasting or fasiculations noted. Reflexes: DTRs 2+ throughout. MRI Result (most recent):  MRI BRAIN W WO CONTRAST 08/08/2022    Narrative  EXAM:  MRI BRAIN W WO CONT    INDICATION:    Seizure disorder. COMPARISON:  None. CONTRAST: 17 ml ProHance. TECHNIQUE:  Multiplanar multisequence acquisition without and with contrast of the brain. FINDINGS:  There are postsurgical changes of left frontal craniectomy. There are prominent  areas of encephalomalacia with associated hemosiderin staining in the right  inferior and lateral frontal lobe, and in the left temporoparietal lobe, with  associated ex vacuo dilation of the left lateral ventricle. There is no acute  infarct, hemorrhage, extra-axial fluid collection, or mass effect. There is no  cerebellar tonsillar herniation. Expected arterial flow-voids are present. No  evidence of abnormal enhancement. Mild mucosal thickening in the right ethmoidal air cells. The mastoid air cells  and middle ears are clear. The orbital contents are within normal limits. Impression  1.  Status

## 2024-01-25 ENCOUNTER — OFFICE VISIT (OUTPATIENT)
Age: 24
End: 2024-01-25
Payer: MEDICAID

## 2024-01-25 VITALS
RESPIRATION RATE: 16 BRPM | WEIGHT: 180 LBS | TEMPERATURE: 98.3 F | SYSTOLIC BLOOD PRESSURE: 110 MMHG | HEIGHT: 71 IN | HEART RATE: 76 BPM | OXYGEN SATURATION: 98 % | BODY MASS INDEX: 25.2 KG/M2 | DIASTOLIC BLOOD PRESSURE: 78 MMHG

## 2024-01-25 DIAGNOSIS — G40.919 INTRACTABLE EPILEPSY WITHOUT STATUS EPILEPTICUS, UNSPECIFIED EPILEPSY TYPE (HCC): Primary | ICD-10-CM

## 2024-01-25 PROCEDURE — 99214 OFFICE O/P EST MOD 30 MIN: CPT | Performed by: NURSE PRACTITIONER

## 2024-01-25 RX ORDER — LEVETIRACETAM 1000 MG/1
TABLET ORAL
Qty: 270 TABLET | Refills: 3 | Status: SHIPPED | OUTPATIENT
Start: 2024-01-25

## 2024-01-25 NOTE — PROGRESS NOTES
Benito Sequeira is a 23 y.o. male who presents with the following  Chief Complaint   Patient presents with    Seizures     Stable no seizure activity        HPI    Follow-up for seizure disorder    He is currently on Keppra 1500 mg twice a day  He has not had any seizures, jerking, twitching or convulsions  No loss of consciousness    He does play video games but not much anymore.   He does eat okay  He has no anxiety or depression  No other concerns from family    He did just moved back to [facility] and is doing well here  He is looking for a job  He does stay active physically with his friends          Allergies   Allergen Reactions    Azithromycin Hives    Sulfa Antibiotics Itching       Current Outpatient Medications   Medication Sig Dispense Refill    levETIRAcetam (KEPPRA) 1000 MG tablet TAKE 1 & 1/2 (ONE & ONE-HALF) TABLETS BY MOUTH TWICE DAILY 270 tablet 3    diazePAM, 20 MG Dose, (VALTOCO 20 MG DOSE) 2 x 10 MG/0.1ML LQPK 1 spray by Nasal route daily as needed       No current facility-administered medications for this visit.        Social History     Tobacco Use   Smoking Status Never    Passive exposure: Yes   Smokeless Tobacco Never       Past Medical History:   Diagnosis Date    Other ill-defined conditions(799.89)     ADHD       No past surgical history on file.    No family history on file.    Social History     Socioeconomic History    Marital status: Single     Spouse name: None    Number of children: None    Years of education: None    Highest education level: None   Tobacco Use    Smoking status: Never     Passive exposure: Yes    Smokeless tobacco: Never   Substance and Sexual Activity    Alcohol use: No    Drug use: No       Review of Systems      Remainder of comprehensive review is negative.     Physical Exam :    /78 (Site: Left Upper Arm, Position: Standing)   Temp 98.3 °F (36.8 °C)   Resp 16   Ht 1.803 m (5' 11\")   Wt 81.6 kg (180 lb)   BMI 25.10 kg/m²     General: Well

## 2024-01-25 NOTE — PROGRESS NOTES
Chief Complaint   Patient presents with    Seizures     Stable no seizure activity      Vitals:    01/25/24 1256   BP: 110/78   Pulse: 76   Resp: 16   Temp: 98.3 °F (36.8 °C)   SpO2: 98%

## 2024-07-25 ENCOUNTER — TELEPHONE (OUTPATIENT)
Age: 24
End: 2024-07-25

## 2024-07-25 DIAGNOSIS — G40.919 INTRACTABLE EPILEPSY WITHOUT STATUS EPILEPTICUS, UNSPECIFIED EPILEPSY TYPE (HCC): ICD-10-CM

## 2024-07-25 RX ORDER — LEVETIRACETAM 1000 MG/1
TABLET ORAL
Qty: 60 TABLET | Refills: 0 | Status: SHIPPED | OUTPATIENT
Start: 2024-07-25

## 2024-07-30 DIAGNOSIS — G40.919 INTRACTABLE EPILEPSY WITHOUT STATUS EPILEPTICUS, UNSPECIFIED EPILEPSY TYPE (HCC): ICD-10-CM

## 2024-08-01 RX ORDER — LEVETIRACETAM 1000 MG/1
TABLET ORAL
Qty: 90 TABLET | Refills: 0 | OUTPATIENT
Start: 2024-08-01

## 2024-08-19 ENCOUNTER — TELEPHONE (OUTPATIENT)
Age: 24
End: 2024-08-19

## 2024-08-19 DIAGNOSIS — G40.919 INTRACTABLE EPILEPSY WITHOUT STATUS EPILEPTICUS, UNSPECIFIED EPILEPSY TYPE (HCC): ICD-10-CM

## 2024-08-19 RX ORDER — LEVETIRACETAM 1000 MG/1
TABLET ORAL
Qty: 60 TABLET | Refills: 0 | Status: SHIPPED | OUTPATIENT
Start: 2024-08-19

## 2024-08-19 RX ORDER — LEVETIRACETAM 1000 MG/1
TABLET ORAL
Qty: 90 TABLET | Refills: 0 | OUTPATIENT
Start: 2024-08-19

## 2024-08-19 NOTE — TELEPHONE ENCOUNTER
Patient calling in a refill of Keppra due to having one pill left In hand.    Pharmacy [facility] on [facility]

## 2024-09-06 DIAGNOSIS — G40.919 INTRACTABLE EPILEPSY WITHOUT STATUS EPILEPTICUS, UNSPECIFIED EPILEPSY TYPE (HCC): ICD-10-CM

## 2024-09-08 RX ORDER — LEVETIRACETAM 1000 MG/1
TABLET ORAL
Qty: 60 TABLET | Refills: 0 | Status: SHIPPED | OUTPATIENT
Start: 2024-09-08

## 2024-09-28 DIAGNOSIS — G40.919 INTRACTABLE EPILEPSY WITHOUT STATUS EPILEPTICUS, UNSPECIFIED EPILEPSY TYPE (HCC): ICD-10-CM

## 2024-09-30 RX ORDER — LEVETIRACETAM 1000 MG/1
TABLET ORAL
Qty: 90 TABLET | Refills: 0 | Status: SHIPPED | OUTPATIENT
Start: 2024-09-30

## 2024-10-28 DIAGNOSIS — G40.919 INTRACTABLE EPILEPSY WITHOUT STATUS EPILEPTICUS, UNSPECIFIED EPILEPSY TYPE (HCC): ICD-10-CM

## 2024-10-28 RX ORDER — LEVETIRACETAM 1000 MG/1
TABLET ORAL
Qty: 90 TABLET | Refills: 0 | Status: SHIPPED | OUTPATIENT
Start: 2024-10-28

## 2024-11-05 ENCOUNTER — OFFICE VISIT (OUTPATIENT)
Age: 24
End: 2024-11-05
Payer: MEDICAID

## 2024-11-05 VITALS
WEIGHT: 180 LBS | HEART RATE: 82 BPM | SYSTOLIC BLOOD PRESSURE: 109 MMHG | OXYGEN SATURATION: 95 % | DIASTOLIC BLOOD PRESSURE: 55 MMHG | HEIGHT: 71 IN | RESPIRATION RATE: 18 BRPM | BODY MASS INDEX: 25.2 KG/M2

## 2024-11-05 DIAGNOSIS — G40.919 INTRACTABLE EPILEPSY WITHOUT STATUS EPILEPTICUS, UNSPECIFIED EPILEPSY TYPE (HCC): Primary | ICD-10-CM

## 2024-11-05 PROCEDURE — 99215 OFFICE O/P EST HI 40 MIN: CPT | Performed by: NURSE PRACTITIONER

## 2024-11-05 RX ORDER — LEVETIRACETAM 1000 MG/1
TABLET ORAL
Qty: 90 TABLET | Refills: 11 | Status: SHIPPED | OUTPATIENT
Start: 2024-11-05

## 2024-11-06 NOTE — PROGRESS NOTES
acute distress.    Neck: Supple, nontender, no bruits, no pain with resistance to active range of motion.    Musculoskeletal: Extremities revealed no edema and had full range of motion of joints.    Psych: Good mood and bright affect    NEUROLOGICAL EXAMINATION:    Mental Status: Alert and oriented to person, place, and time    Cranial Nerves:    II, III, IV, VI: Visual acuity grossly intact. Visual fields are normal.    Pupils are equal, round, and reactive to light and accommodation.    Extra-ocular movements are full and fluid. Fundoscopic exam was benign, no ptosis or nystagmus.    V-XII: Hearing is grossly intact. Facial features are symmetric, with normal sensation and strength. The palate rises symmetrically and the tongue protrudes midline. Sternocleidomastoids 5/5.     Motor Examination: Normal tone, bulk, and strength, 5/5 muscle strength throughout.     Coordination: Finger to nose was normal. No resting or intention tremor    Gait and Station: Steady while walking. Normal arm swing. No pronator drift. No muscle wasting or fasiculations noted.         Total time: 45 min   Counseling / coordination time: 40 min   > 50% counseling / coordination?: Yes re: records        MRI Result (most recent):  MRI BRAIN W WO CONTRAST 08/08/2022    Narrative  EXAM:  MRI BRAIN W WO CONT    INDICATION:    Seizure disorder.    COMPARISON:  None.    CONTRAST: 17 ml ProHance.    TECHNIQUE:  Multiplanar multisequence acquisition without and with contrast of the brain.    FINDINGS:  There are postsurgical changes of left frontal craniectomy. There are prominent  areas of encephalomalacia with associated hemosiderin staining in the right  inferior and lateral frontal lobe, and in the left temporoparietal lobe, with  associated ex vacuo dilation of the left lateral ventricle. There is no acute  infarct, hemorrhage, extra-axial fluid collection, or mass effect. There is no  cerebellar tonsillar herniation. Expected arterial

## 2025-06-04 ENCOUNTER — HOSPITAL ENCOUNTER (EMERGENCY)
Facility: HOSPITAL | Age: 25
Discharge: HOME | End: 2025-06-04
Attending: EMERGENCY MEDICINE
Payer: MEDICAID

## 2025-06-04 ENCOUNTER — APPOINTMENT (OUTPATIENT)
Facility: HOSPITAL | Age: 25
End: 2025-06-04
Payer: MEDICAID

## 2025-06-04 VITALS
HEIGHT: 71 IN | SYSTOLIC BLOOD PRESSURE: 117 MMHG | RESPIRATION RATE: 18 BRPM | DIASTOLIC BLOOD PRESSURE: 72 MMHG | OXYGEN SATURATION: 99 % | WEIGHT: 185 LBS | BODY MASS INDEX: 25.9 KG/M2 | HEART RATE: 80 BPM | TEMPERATURE: 97.5 F

## 2025-06-04 DIAGNOSIS — S39.012A BACK STRAIN, INITIAL ENCOUNTER: Primary | ICD-10-CM

## 2025-06-04 PROCEDURE — 72100 X-RAY EXAM L-S SPINE 2/3 VWS: CPT

## 2025-06-04 PROCEDURE — 96372 THER/PROPH/DIAG INJ SC/IM: CPT

## 2025-06-04 PROCEDURE — 99284 EMERGENCY DEPT VISIT MOD MDM: CPT

## 2025-06-04 PROCEDURE — 6360000002 HC RX W HCPCS: Performed by: PHYSICIAN ASSISTANT

## 2025-06-04 RX ORDER — NAPROXEN 500 MG/1
500 TABLET ORAL 2 TIMES DAILY PRN
Qty: 20 TABLET | Refills: 0 | Status: SHIPPED | OUTPATIENT
Start: 2025-06-04 | End: 2025-06-14

## 2025-06-04 RX ORDER — CYCLOBENZAPRINE HCL 10 MG
10 TABLET ORAL 3 TIMES DAILY PRN
Qty: 20 TABLET | Refills: 0 | Status: SHIPPED | OUTPATIENT
Start: 2025-06-04 | End: 2025-06-11

## 2025-06-04 RX ORDER — LIDOCAINE 4 G/G
1 PATCH TOPICAL EVERY 24 HOURS
Qty: 30 PATCH | Refills: 0 | Status: SHIPPED | OUTPATIENT
Start: 2025-06-04 | End: 2025-07-04

## 2025-06-04 RX ORDER — KETOROLAC TROMETHAMINE 30 MG/ML
15 INJECTION, SOLUTION INTRAMUSCULAR; INTRAVENOUS
Status: COMPLETED | OUTPATIENT
Start: 2025-06-04 | End: 2025-06-04

## 2025-06-04 RX ADMIN — KETOROLAC TROMETHAMINE 15 MG: 30 INJECTION, SOLUTION INTRAMUSCULAR at 15:23

## 2025-06-04 ASSESSMENT — PAIN SCALES - GENERAL
PAINLEVEL_OUTOF10: 5
PAINLEVEL_OUTOF10: 8

## 2025-06-04 ASSESSMENT — PAIN DESCRIPTION - FREQUENCY: FREQUENCY: CONTINUOUS

## 2025-06-04 ASSESSMENT — PAIN - FUNCTIONAL ASSESSMENT: PAIN_FUNCTIONAL_ASSESSMENT: ACTIVITIES ARE NOT PREVENTED

## 2025-06-04 ASSESSMENT — PAIN DESCRIPTION - DESCRIPTORS
DESCRIPTORS: OTHER (COMMENT)
DESCRIPTORS_2: TIGHTNESS
DESCRIPTORS: ACHING

## 2025-06-04 ASSESSMENT — PAIN DESCRIPTION - INTENSITY: RATING_2: 4

## 2025-06-04 ASSESSMENT — PAIN DESCRIPTION - ORIENTATION
ORIENTATION: RIGHT
ORIENTATION_2: POSTERIOR
ORIENTATION: LOWER;RIGHT

## 2025-06-04 ASSESSMENT — PAIN DESCRIPTION - ONSET: ONSET: ON-GOING

## 2025-06-04 ASSESSMENT — PAIN DESCRIPTION - PAIN TYPE: TYPE: ACUTE PAIN

## 2025-06-04 ASSESSMENT — PAIN DESCRIPTION - LOCATION
LOCATION_2: NECK
LOCATION: BACK
LOCATION: BACK
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
